# Patient Record
Sex: FEMALE | Employment: STUDENT | URBAN - METROPOLITAN AREA
[De-identification: names, ages, dates, MRNs, and addresses within clinical notes are randomized per-mention and may not be internally consistent; named-entity substitution may affect disease eponyms.]

---
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
## 2022-03-23 ENCOUNTER — TELEPHONE (OUTPATIENT)
Dept: OBSTETRICS AND GYNECOLOGY | Facility: CLINIC | Age: 22
End: 2022-03-23

## 2022-03-23 DIAGNOSIS — Z32.00 POSSIBLE PREGNANCY: Primary | ICD-10-CM

## 2022-03-23 LAB — B-HCG SERPL-ACNC: 193 MIU/ML

## 2022-03-23 NOTE — TELEPHONE ENCOUNTER
----- Message from Alexa Olson sent at 3/23/2022  8:57 AM CDT -----  Contact: PT  Type:  Same Day Appointment Request    Caller is requesting a same day appointment.  Caller declined first available appointment listed below.    Name of Caller: China   When is the first available appointment? 03/23/2022  Symptoms: pregnancy confirmation   Best Call Back Number:.[phone] (home)        Additional Information:

## 2022-03-23 NOTE — TELEPHONE ENCOUNTER
----- Message from Alexa Olson sent at 3/23/2022  8:57 AM CDT -----  Contact: PT  Type:  Same Day Appointment Request    Caller is requesting a same day appointment.  Caller declined first available appointment listed below.    Name of Caller: China   When is the first available appointment? 03/23/2022  Symptoms: pregnancy confirmation   Best Call Back Number:.[phone] (home)        Additional Information:

## 2022-03-23 NOTE — TELEPHONE ENCOUNTER
----- Message from Alexa Olson sent at 3/23/2022  8:57 AM CDT -----  Contact: PT  Type:  Same Day Appointment Request    Caller is requesting a same day appointment.  Caller declined first available appointment listed below.    Name of Caller: China   When is the first available appointment? 03/23/2022  Symptoms: pregnancy confirmation   Best Call Back Number:.[phone] (home)        Additional Information:

## 2022-03-24 DIAGNOSIS — Z32.00 POSSIBLE PREGNANCY: Primary | ICD-10-CM

## 2022-03-25 LAB — B-HCG SERPL-ACNC: 466 MIU/ML

## 2022-03-28 ENCOUNTER — TELEPHONE (OUTPATIENT)
Dept: OBSTETRICS AND GYNECOLOGY | Facility: CLINIC | Age: 22
End: 2022-03-28

## 2022-03-28 NOTE — TELEPHONE ENCOUNTER
Called and spoke with patient in regards to her HCG results. Pt is aware and has a doctor in mind but is waiting for confirmation on Medicaid. I adv patient to call the doctor who she is wanting to see and be put on a schedule sooner rather than later since we see OB at 8-10 weeks for the first visit. Pt is aware and verbalized understanding. Lo Roche

## 2022-11-24 ENCOUNTER — OUTSIDE PLACE OF SERVICE (OUTPATIENT)
Dept: OBSTETRICS AND GYNECOLOGY | Facility: CLINIC | Age: 22
End: 2022-11-24
Payer: COMMERCIAL

## 2022-11-24 PROCEDURE — 99024 POSTOP FOLLOW-UP VISIT: CPT | Mod: ,,, | Performed by: OBSTETRICS & GYNECOLOGY

## 2022-11-24 PROCEDURE — 99024 PR POST-OP FOLLOW-UP VISIT: ICD-10-PCS | Mod: ,,, | Performed by: OBSTETRICS & GYNECOLOGY
